# Patient Record
Sex: FEMALE | Race: AMERICAN INDIAN OR ALASKA NATIVE | Employment: FULL TIME | URBAN - METROPOLITAN AREA
[De-identification: names, ages, dates, MRNs, and addresses within clinical notes are randomized per-mention and may not be internally consistent; named-entity substitution may affect disease eponyms.]

---

## 2017-02-03 ENCOUNTER — OFFICE VISIT (OUTPATIENT)
Dept: OBGYN CLINIC | Age: 47
End: 2017-02-03

## 2017-02-03 VITALS
DIASTOLIC BLOOD PRESSURE: 80 MMHG | SYSTOLIC BLOOD PRESSURE: 126 MMHG | HEIGHT: 66 IN | WEIGHT: 171 LBS | HEART RATE: 69 BPM | BODY MASS INDEX: 27.48 KG/M2

## 2017-02-03 DIAGNOSIS — N93.9 ABNORMAL UTERINE BLEEDING (AUB): Primary | ICD-10-CM

## 2017-02-03 DIAGNOSIS — E66.3 OVERWEIGHT (BMI 25.0-29.9): ICD-10-CM

## 2017-02-03 RX ORDER — LANOLIN ALCOHOL/MO/W.PET/CERES
CREAM (GRAM) TOPICAL 2 TIMES DAILY
COMMUNITY

## 2017-02-03 RX ORDER — ZOLPIDEM TARTRATE 10 MG/1
TABLET ORAL
COMMUNITY

## 2017-02-03 NOTE — PATIENT INSTRUCTIONS
Abnormal Uterine Bleeding: Care Instructions  Your Care Instructions  Abnormal uterine bleeding (AUB) is irregular bleeding from the uterus that is longer or heavier than usual or does not occur at your regular time. Sometimes it is caused by changes in hormone levels. It can also be caused by growths in the uterus, such as fibroids or polyps. Sometimes a cause cannot be found. You may have heavy bleeding when you are not expecting your period. Your doctor may suggest a pregnancy test, if you think you are pregnant. Follow-up care is a key part of your treatment and safety. Be sure to make and go to all appointments, and call your doctor if you are having problems. It's also a good idea to know your test results and keep a list of the medicines you take. How can you care for yourself at home? · Be safe with medicines. Take pain medicines exactly as directed. ¨ If the doctor gave you a prescription medicine for pain, take it as prescribed. ¨ If you are not taking a prescription pain medicine, ask your doctor if you can take an over-the-counter medicine. · You may be low in iron because of blood loss. Eat a balanced diet that is high in iron and vitamin C. Foods rich in iron include red meat, shellfish, eggs, beans, and leafy green vegetables. Talk to your doctor about whether you need to take iron pills or a multivitamin. When should you call for help? Call 911 anytime you think you may need emergency care. For example, call if:  · You passed out (lost consciousness). Call your doctor now or seek immediate medical care if:  · You have sudden, severe pain in your belly or pelvis. · You have severe vaginal bleeding. You are soaking through your usual pads or tampons every hour for 2 or more hours. · You feel dizzy or lightheaded, or you feel like you may faint. Watch closely for changes in your health, and be sure to contact your doctor if:  · You have new belly or pelvic pain.   · You have a fever.  · Your bleeding gets worse or lasts longer than 1 week. · You think you may be pregnant. Where can you learn more? Go to http://capo-evette.info/. Enter U301 in the search box to learn more about \"Abnormal Uterine Bleeding: Care Instructions. \"  Current as of: February 25, 2016  Content Version: 11.1  © 3070-5810 [employer]. Care instructions adapted under license by [employer] (which disclaims liability or warranty for this information). If you have questions about a medical condition or this instruction, always ask your healthcare professional. [place] [address] any warranty or liability for your use of this information.

## 2017-02-14 ENCOUNTER — TELEPHONE (OUTPATIENT)
Dept: OBGYN CLINIC | Age: 47
End: 2017-02-14

## 2017-02-14 DIAGNOSIS — N93.9 ABNORMAL UTERINE BLEEDING (AUB): Primary | ICD-10-CM

## 2017-02-14 NOTE — TELEPHONE ENCOUNTER
Patient called regarding test results she had done on Feb 3.  Please fax results to her pcp at [phone]

## 2017-02-14 NOTE — TELEPHONE ENCOUNTER
Patient notified and voices understandin. 2017 office note was faxed as requested to:   PCP, Vicenta Michel PA-C   [phone], fax [phone]  Patient did not have any test for this visit. 2. Sent msg to physician requesting ultrasound order.

## 2017-02-20 ENCOUNTER — HOSPITAL ENCOUNTER (OUTPATIENT)
Dept: ULTRASOUND IMAGING | Age: 47
Discharge: HOME | End: 2017-02-20
Attending: OBSTETRICS & GYNECOLOGY
Payer: OTHER GOVERNMENT

## 2017-02-20 DIAGNOSIS — N93.9 ABNORMAL UTERINE BLEEDING (AUB): ICD-10-CM

## 2017-02-20 PROCEDURE — 76830 TRANSVAGINAL US NON-OB: CPT

## 2017-02-23 ENCOUNTER — TELEPHONE (OUTPATIENT)
Dept: OBGYN CLINIC | Age: 47
End: 2017-02-23

## 2017-02-24 NOTE — TELEPHONE ENCOUNTER
Please advise, patient was to return in 2 weeks to review results and determine appropriate plan of care.

## 2017-02-27 ENCOUNTER — OFFICE VISIT (OUTPATIENT)
Dept: OBGYN CLINIC | Age: 47
End: 2017-02-27

## 2017-02-27 VITALS
WEIGHT: 168 LBS | DIASTOLIC BLOOD PRESSURE: 78 MMHG | HEART RATE: 75 BPM | SYSTOLIC BLOOD PRESSURE: 135 MMHG | HEIGHT: 66 IN | BODY MASS INDEX: 27 KG/M2

## 2017-02-27 DIAGNOSIS — N93.9 ABNORMAL UTERINE BLEEDING (AUB): Primary | ICD-10-CM

## 2017-02-27 LAB
HCG URINE, QL. (POC): NEGATIVE
VALID INTERNAL CONTROL?: YES

## 2017-02-27 RX ORDER — MEDROXYPROGESTERONE ACETATE 150 MG/ML
150 INJECTION, SUSPENSION INTRAMUSCULAR ONCE
Qty: 1 SYRINGE | Refills: 4 | Status: SHIPPED | OUTPATIENT
Start: 2017-02-27 | End: 2017-02-27

## 2017-02-27 NOTE — PROGRESS NOTES
Patient here to discuss pelvic US result and management of AUB. No complaints today. Past Medical History:   Diagnosis Date    Abnormal Papanicolaou smear of cervix     Anemia      Visit Vitals    /78    Pulse 75    Ht 5' 6\" (1.676 m)    Wt 168 lb (76.2 kg)    LMP 01/23/2017    BMI 27.12 kg/m2     Gen:  NAD  I personally reviewed pelvic US report and images with patient, significant for 11 cm uterus with 5 cm anterior intramural leiomyoma, slightly distorting the uterine cavity. Small follicular ovarian cyst.      A/P:  >50% of 20 minute visit spent counseling on     ICD-10-CM ICD-9-CM    1. Abnormal uterine bleeding (AUB) N93.9 626.9 medroxyPROGESTERone (DEPO-PROVERA) 150 mg/mL syrg      IN MEDROXYPROGESTERONE ACETATE, 1MG      IN THER/PROPH/DIAG INJECTION, SUBCUT/IM     Observation with continued weight loss, hormonal management options, UFE, myomectomy, hysterectomy options discussed. Patient opts for medical management at this time. Proper use, common side effects and risks reviewed. Follow up 3 mos for WWE and DMPA dose. Plan of care discussed. Patient expressed understanding and agreement.

## 2017-02-27 NOTE — PROGRESS NOTES
1. Negative UPT. 2. Medroxyprogesterone (Depo Provera) 150 mg, IM injection, left deltoid. 3.  Pt tolerated injection well & voices no complaints.    4. Next injection due May 15, 2017 - May 29, 2017.  1051 [facility], [address] [facility], [address] [facility], EXP

## 2017-02-27 NOTE — PATIENT INSTRUCTIONS
Learning About Birth Control: The Shot  What is the shot? The shot is used to prevent pregnancy. You get the shot in your upper arm or rear end (buttocks). The shot gives you a dose of the hormone progestin. The shot is often called by its brand name, Depo-Provera. Progestin prevents pregnancy in these ways: It thickens the mucus in the cervix. This makes it hard for sperm to travel into the uterus. It also thins the lining of the uterus, which makes it harder for a fertilized egg to attach to the uterus. Progestin can sometimes stop the ovaries from releasing an egg each month (ovulation). The shot provides birth control for 3 months at a time. You then need another shot. The shot can cause bone loss. Most women can use it safely for up to 2 years and then may choose to switch to another form of birth control. Some women may be able to use the shot for more than 2 years. How well does it work? In the first year of use:  · When the shot is used exactly as directed, fewer than 1 woman out of 100 has an unplanned pregnancy. · When the shot is not used exactly as directed, 6 women out of 100 have an unplanned pregnancy. Be sure to tell your doctor about any health problems you have or medicines you take. He or she can help you choose the birth control method that is right for you. What are the advantages of the shot? · The shot is one of the most effective methods of birth control. · It's convenient. You need to get a shot only once every 3 months to prevent pregnancy. You don't have to interrupt sex to protect against pregnancy. · It prevents pregnancy for 3 months at a time. You don't have to worry about birth control for this time. · It's safe to use while breastfeeding. · The shot may reduce heavy bleeding and cramping. · The shot doesn't contain estrogen. So you can use it if you don't want to take estrogen or can't take estrogen because you have certain health problems or concerns.   What are the disadvantages of the shot? · The shot doesn't protect against sexually transmitted infections (STIs), such as herpes or HIV/AIDS. If you aren't sure if your sex partner might have an STI, use a condom to protect against disease. · The shot may cause bone loss in some women. You shouldn't use this method for more than 2 years without talking to your doctor first about the risks and benefits. · Any side effects may last up to 3 months. ¨ The shot may cause irregular periods, or you may have spotting between periods. You may also stop getting a period. Some women see having no period as an advantage. ¨ It may cause mood changes, less interest in sex, or weight gain. · You must go to the doctor every 3 months to get the shot. · If you want to get pregnant, it may take 9 to 10 months after you stop getting the shot. This is because the hormones the shot provided have to leave your system, and your body has to readjust.  · If you have severe side effects, you have to wait for the hormones to get out of your system. This may take up to 3 months. Where can you learn more? Go to http://capo-evette.info/. Enter D630 in the search box to learn more about \"Learning About Birth Control: The Shot. \"  Current as of: May 30, 2016  Content Version: 11.1  © 3188-2609 [employer]. Care instructions adapted under license by [employer] (which disclaims liability or warranty for this information). If you have questions about a medical condition or this instruction, always ask your healthcare professional. [place] [address] any warranty or liability for your use of this information.

## 2017-02-27 NOTE — MR AVS SNAPSHOT
Visit Information Date & Time Provider Department Dept. Phone Encounter #  
 2017  8:15 AM Stacy Cabrera DO [facility] [phone] [id number] Follow-up Instructions Return in about 3 months (around 2017) for WWE and depo. Your Appointments 2017 11:45 AM  
DEPO with Stacy Cabrera DO  
[facility] (MARCOS SCHEDULING) Appt Note: depo [address] [phone]  
[phone]  
  
   
 [address] [phone] Upcoming Health Maintenance Date Due  
 PAP AKA CERVICAL CYTOLOGY 12/3/1991 INFLUENZA AGE 9 TO ADULT 2016 Allergies as of 2017  Review Complete On: 2017 By: Stacy Cabrera DO No Known Allergies Current Immunizations  Never Reviewed No immunizations on file. Not reviewed this visit You Were Diagnosed With   
  
 Codes Comments Abnormal uterine bleeding (AUB)    -  Primary ICD-10-CM: N93.9 ICD-9-CM: 626.9 Vitals BP  
  
  
  
  
  
 135/78 BMI and BSA Data Body Mass Index Body Surface Area  
 27.12 kg/m 2 1.88 m 2 Preferred Pharmacy Pharmacy Name Phone [facility] [place], [address] [phone] Your Updated Medication List  
  
   
This list is accurate as of: 17  9:24 AM.  Always use your most recent med list.  
  
  
  
  
 AMBIEN 10 mg tablet Generic drug:  zolpidem Take  by mouth nightly as needed for Sleep. Iron 325 mg (65 mg iron) tablet Generic drug:  ferrous sulfate Take  by mouth two (2) times a day. medroxyPROGESTERone 150 mg/mL Syrg Commonly known as:  DEPO-PROVERA  
1 mL by IntraMUSCular route once for 1 dose. Indications: AUB Prescriptions Sent to Pharmacy Refills  
 medroxyPROGESTERone (DEPO-PROVERA) 150 mg/mL syrg 4 Si mL by IntraMUSCular route once for 1 dose. Indications: AUB  Class: Normal  
 Pharmacy: [id number] Code On Network Coding. ScreenTag  #: [phone] Route: IntraMUSCular We Performed the Following NE MEDROXYPROGESTERONE ACETATE, 1MG [ [facility]] NE THER/PROPH/DIAG INJECTION, SUBCUT/IM [id number] CPT(R)] Follow-up Instructions Return in about 3 months (around 5/27/2017) for WWE and depo. Patient Instructions Learning About Birth Control: The Shot What is the shot? The shot is used to prevent pregnancy. You get the shot in your upper arm or rear end (buttocks). The shot gives you a dose of the hormone progestin. The shot is often called by its brand name, Depo-Provera. Progestin prevents pregnancy in these ways: It thickens the mucus in the cervix. This makes it hard for sperm to travel into the uterus. It also thins the lining of the uterus, which makes it harder for a fertilized egg to attach to the uterus. Progestin can sometimes stop the ovaries from releasing an egg each month (ovulation). The shot provides birth control for 3 months at a time. You then need another shot. The shot can cause bone loss. Most women can use it safely for up to 2 years and then may choose to switch to another form of birth control. Some women may be able to use the shot for more than 2 years. How well does it work? In the first year of use: · When the shot is used exactly as directed, fewer than 1 woman out of 100 has an unplanned pregnancy. · When the shot is not used exactly as directed, 6 women out of 100 have an unplanned pregnancy. Be sure to tell your doctor about any health problems you have or medicines you take. He or she can help you choose the birth control method that is right for you. What are the advantages of the shot? · The shot is one of the most effective methods of birth control. · It's convenient. You need to get a shot only once every 3 months to prevent pregnancy. You don't have to interrupt sex to protect against pregnancy. · It prevents pregnancy for 3 months at a time. You don't have to worry about birth control for this time. · It's safe to use while breastfeeding. · The shot may reduce heavy bleeding and cramping. · The shot doesn't contain estrogen. So you can use it if you don't want to take estrogen or can't take estrogen because you have certain health problems or concerns. What are the disadvantages of the shot? · The shot doesn't protect against sexually transmitted infections (STIs), such as herpes or HIV/AIDS. If you aren't sure if your sex partner might have an STI, use a condom to protect against disease. · The shot may cause bone loss in some women. You shouldn't use this method for more than 2 years without talking to your doctor first about the risks and benefits. · Any side effects may last up to 3 months. ¨ The shot may cause irregular periods, or you may have spotting between periods. You may also stop getting a period. Some women see having no period as an advantage. ¨ It may cause mood changes, less interest in sex, or weight gain. · You must go to the doctor every 3 months to get the shot. · If you want to get pregnant, it may take 9 to 10 months after you stop getting the shot. This is because the hormones the shot provided have to leave your system, and your body has to readjust. 
· If you have severe side effects, you have to wait for the hormones to get out of your system. This may take up to 3 months. Where can you learn more? Go to http://capo-evette.info/. Enter P379 in the search box to learn more about \"Learning About Birth Control: The Shot. \" Current as of: May 30, 2016 Content Version: 11.1 © 7841-9439 [employer]. Care instructions adapted under license by [employer]gt.[employer] (which disclaims liability or warranty for this information).  If you have questions about a medical condition or this instruction, always ask your healthcare professional. [place] [address] any warranty or liability for your use of this information. Introducing [facility]! Mishel Asif introduces [employer] patient portal. Now you can access parts of your medical record, email your doctor's office, and request medication refills online. 1. In your internet browser, go to [email or web address] 2. Click on the First Time User? Click Here link in the Sign In box. You will see the New Member Sign Up page. 3. Enter your [employer] Access Code exactly as it appears below. You will not need to use this code after youve completed the sign-up process. If you do not sign up before the expiration date, you must request a new code. · [employer] Access Code: [email or web address] Expires: 5/16/2017 11:09 AM 
 
4. Enter the last four digits of your Social Security Number (xxxx) and Date of Birth (mm/dd/yyyy) as indicated and click Submit. You will be taken to the next sign-up page. 5. Create a [employer] ID. This will be your [employer] login ID and cannot be changed, so think of one that is secure and easy to remember. 6. Create a [employer] password. You can change your password at any time. 7. Enter your Password Reset Question and Answer. This can be used at a later time if you forget your password. 8. Enter your e-mail address. You will receive e-mail notification when new information is available in [address] 9. Click Sign Up. You can now view and download portions of your medical record. 10. Click the Download Summary menu link to download a portable copy of your medical information. If you have questions, please visit the Frequently Asked Questions section of the [employer] website. Remember, [employer] is NOT to be used for urgent needs. For medical emergencies, dial 911. Now available from your iPhone and Android! Please provide this summary of care documentation to your next provider. Your primary care clinician is listed as Keshawn Lechuga. If you have any questions after today's visit, please call [phone].

## 2017-05-16 ENCOUNTER — OFFICE VISIT (OUTPATIENT)
Dept: OBGYN CLINIC | Age: 47
End: 2017-05-16

## 2017-05-16 ENCOUNTER — HOSPITAL ENCOUNTER (OUTPATIENT)
Dept: LAB | Age: 47
Discharge: HOME | End: 2017-05-16
Payer: OTHER GOVERNMENT

## 2017-05-16 DIAGNOSIS — N92.1 BREAKTHROUGH BLEEDING ON DEPO PROVERA: ICD-10-CM

## 2017-05-16 DIAGNOSIS — Z01.419 WELL FEMALE EXAM WITH ROUTINE GYNECOLOGICAL EXAM: Primary | ICD-10-CM

## 2017-05-16 DIAGNOSIS — Z30.42 FAMILY PLANNING, DEPO-PROVERA CONTRACEPTION MONITORING/ADMINISTRATION: ICD-10-CM

## 2017-05-16 DIAGNOSIS — N93.9 ABNORMAL UTERINE BLEEDING (AUB): ICD-10-CM

## 2017-05-16 PROBLEM — D25.1 INTRAMURAL LEIOMYOMA OF UTERUS: Status: ACTIVE | Noted: 2017-05-16

## 2017-05-16 LAB
HCG URINE, QL. (POC): NEGATIVE
VALID INTERNAL CONTROL?: YES

## 2017-05-16 PROCEDURE — 88175 CYTOPATH C/V AUTO FLUID REDO: CPT | Performed by: OBSTETRICS & GYNECOLOGY

## 2017-05-16 PROCEDURE — 87624 HPV HI-RISK TYP POOLED RSLT: CPT | Performed by: OBSTETRICS & GYNECOLOGY

## 2017-05-16 RX ORDER — MEDROXYPROGESTERONE ACETATE 150 MG/ML
INJECTION, SUSPENSION INTRAMUSCULAR
COMMUNITY
Start: 2017-02-27 | End: 2018-05-23 | Stop reason: SDUPTHER

## 2017-05-16 NOTE — PROGRESS NOTES
[facility]  [address] [facility], [address] [facility] EXAM    Name: Bee Multani MRN: [id number] SSN: [id number]    YOB: 1970  Age: 55 y.o. Sex: female       Subjective:      Chief complaint:    Chief Complaint   Patient presents with    Well Woman    Family Planning     depo provera injection        Opal Nguyen is a 55 y.o. female presents today for well visit. No complaints. Here for DMPA dose. Reports breakthrough bleeding after first dose. Review of Systems:   General ROS: negative for - fever, chills, malaise  Endocrine ROS: negative for -  breast tenderness/mass/nipple discharge/galactorrhea, hair pattern changes, hot flashes, skin changes or temperature intolerance. Respiratory ROS: no cough, shortness of breath, or wheezing  Cardiovascular ROS: no chest pain or dyspnea on exertion  Gastrointestinal ROS: no abdominal pain, change in bowel habits, or black or bloody stools, nausea, vomiting  Genito-Urinary ROS: no dysuria, trouble voiding, incontinence or hematuria. No pelvic pain, unusual discharge  Musculoskeletal ROS: negative  Neurological ROS: no TIA or stroke symptoms  Dermatological ROS: negative  Denies personal or FH of fractures. OB History      Para Term  AB TAB SAB Ectopic Multiple Living    2 2 2       2        Gynecology:  Hx AUB and uterine fibroid. Health maintenance:  Mammogram up to date: benign. no hx of abnormal pap smears. Last pap:  . Past Medical History:   Diagnosis Date    Abnormal Papanicolaou smear of cervix     Anemia      Past Surgical History:   Procedure Laterality Date    HX COLPOSCOPY      HX ORTHOPAEDIC      LAP,TUBAL CAUTERY       Family History   Problem Relation Age of Onset   [address] [facility] MS Mother     Diabetes Mother     Diabetes Father     Ovarian Cancer Other 39     maternal aunt, early stage     Social History     Occupational History    Not on file.      Social History Main Topics    Smoking status: Former Smoker    Smokeless tobacco: Never Used    Alcohol use No    Drug use: No    Sexual activity: Not Currently     Family History   Problem Relation Age of Onset   [place] MS Mother     Diabetes Mother     Diabetes Father     Ovarian Cancer Other 39     maternal aunt, early stage     No Known Allergies  Prior to Admission medications    Medication Sig Start Date End Date Taking? Authorizing Provider   medroxyPROGESTERone (DEPO-PROVERA) 150 mg/mL injection  2/27/17  Yes Historical Provider   ferrous sulfate (IRON) 325 mg (65 mg iron) tablet Take  by mouth two (2) times a day. Yes Historical Provider   zolpidem (AMBIEN) 10 mg tablet Take  by mouth nightly as needed for Sleep. Yes Historical Provider          Objective: There were no vitals filed for this visit. There is no height or weight on file to calculate BMI. Physical Exam:  General appearance - well appearing, and in no distress and well hydrated  Mental status - alert, oriented to person, place, and time, normal mood, behavior, speech, dress, motor activity, and thought processes  Neck:  No lymphadenopathy, no thyroid enlargement/tenderness  Respiratory:  Normal respiratory effort, no intercostal retractions or use of accessory muscles. Breasts - symmetric fibrocystic tissue upper outer quadrant bilaterally, otherwise, breasts appear normal, no suspicious masses, no skin or nipple changes or axillary nodes  Abdomen - soft, nontender, nondistended, no masses or organomegaly  Pelvic - No inguinal lymphadenopathy, normal urethral meatus and no bladder tenderness.  VULVA: normal appearing vulva with no masses, tenderness or lesions, VAGINA: normal appearing vagina with normal color and discharge, no lesions, PELVIC FLOOR EXAM: no cystocele, rectocele or prolapse noted, CERVIX: normal appearing cervix without discharge or lesions, UTERUS: uterus is anteverted, irregular shape, nontender, mobile, ADNEXA: normal adnexa in size, nontender and no masses  Extremities - No edema. Skin - normal coloration and turgor, no rashes, no suspicious skin lesions noted    Assessment/Plan:       ICD-10-CM ICD-9-CM    1. Well female exam with routine gynecological exam Z01.419 V72.31 PAP IG, APTIMA HPV AND RFX 16/18,45 ([id number])      AMB POC URINE PREGNANCY TEST, VISUAL COLOR COMPARISON   2. Abnormal uterine bleeding (AUB) N93.9 626.9 PA MEDROXYPROGESTERONE ACETATE, 1MG      PA THER/PROPH/DIAG INJECTION, SUBCUT/IM      AMB POC URINE PREGNANCY TEST, VISUAL COLOR COMPARISON   3. Breakthrough bleeding on depo provera N92.1 626.6 AMB POC URINE PREGNANCY TEST, VISUAL COLOR COMPARISON   4. Family planning, Depo-Provera contraception monitoring/administration Z30.42 V25.49 AMB POC URINE PREGNANCY TEST, VISUAL COLOR COMPARISON       Encouraged breast self-awareness. Supplement OTC vitamins or diet rich in vit D and Calcium. Current pap smear screening guidelines reviewed. Provided guidance regarding healthy lifestyle with staying nicotine-free, caloric restrictions and regular exercise. No contraindication to continue DMPA for menstrual regulation. Proper use and common side effects/risks reviewed. Follow up annually/prn. Plan of care discussed. Patient expressed understanding.       Signed By:  Julito Bertrand DO     May 16, 2017

## 2017-05-16 NOTE — PROGRESS NOTES
1. Negative UPT. 2. Medroxyprogesterone (Depo Provera) 150 mg, IM injection, left deltoid. 3.  Pt tolerated injection well & voices no complaints.    4. Next injection due Aug 1, 2017 - Aug 15, 2017.  [address] [facility], [address], [facility], EXP

## 2017-05-16 NOTE — PATIENT INSTRUCTIONS

## 2017-08-01 ENCOUNTER — CLINICAL SUPPORT (OUTPATIENT)
Dept: OBGYN CLINIC | Age: 47
End: 2017-08-01

## 2017-08-01 DIAGNOSIS — N93.9 ABNORMAL UTERINE BLEEDING (AUB): Primary | ICD-10-CM

## 2017-08-01 LAB
HCG URINE, QL. (POC): NEGATIVE
VALID INTERNAL CONTROL?: YES

## 2017-08-01 NOTE — PROGRESS NOTES
1. Negative UPT. 2. Medroxyprogesterone (Depo Provera) 150 mg, IM injection, left deltoid. 3.  Pt tolerated injection well & voices no complaints.    4. Next injection due Oct. 17, 2017 - Oct. 31, 2017.  [facility], [address] [facility], [facility], EXP 01-

## 2017-10-17 ENCOUNTER — CLINICAL SUPPORT (OUTPATIENT)
Dept: OBGYN CLINIC | Age: 47
End: 2017-10-17

## 2017-10-17 DIAGNOSIS — N92.1 BREAKTHROUGH BLEEDING ON DEPO PROVERA: ICD-10-CM

## 2017-10-17 DIAGNOSIS — D25.1 INTRAMURAL LEIOMYOMA OF UTERUS: Primary | ICD-10-CM

## 2017-10-17 DIAGNOSIS — Z30.42 FAMILY PLANNING, DEPO-PROVERA CONTRACEPTION MONITORING/ADMINISTRATION: ICD-10-CM

## 2017-10-17 LAB
HCG URINE, QL. (POC): NEGATIVE
VALID INTERNAL CONTROL?: YES

## 2017-10-17 NOTE — PROGRESS NOTES
1. Negative UPT. 2. Medroxyprogesterone (Depo Provera) 150 mg, IM injection, right deltoid. 3.  Pt tolerated injection well & voices no complaints. 4. Next injection due Jan 2, 2018 - Jan 16, 2018.   1051 [facility], [address], [place] [address], EXP 02-, Artem Gastelum LPN

## 2018-01-02 ENCOUNTER — OFFICE VISIT (OUTPATIENT)
Dept: OBGYN CLINIC | Age: 48
End: 2018-01-02

## 2018-01-02 DIAGNOSIS — N93.9 ABNORMAL UTERINE BLEEDING (AUB): Primary | ICD-10-CM

## 2018-01-02 LAB
HCG URINE, QL. (POC): NEGATIVE
VALID INTERNAL CONTROL?: YES

## 2018-01-02 NOTE — PROGRESS NOTES
Negative UPT. Medroxyprogesterone (Depo Provera) 150 mg, IM injection, left deltoid. Pt tolerated injection well & voices no complaints. Next injection due Mar.  20, 2018 - Apr. 3, 2018.   [place], [address], [address] [place] [address], EXP 03-, Andressa Schmitz LPN

## 2018-03-21 ENCOUNTER — TELEPHONE (OUTPATIENT)
Dept: OBGYN CLINIC | Age: 48
End: 2018-03-21

## 2018-03-22 ENCOUNTER — CLINICAL SUPPORT (OUTPATIENT)
Dept: OBGYN CLINIC | Age: 48
End: 2018-03-22

## 2018-03-22 DIAGNOSIS — Z30.42 FAMILY PLANNING, DEPO-PROVERA CONTRACEPTION MONITORING/ADMINISTRATION: Primary | ICD-10-CM

## 2018-03-22 LAB
HCG URINE, QL. (POC): NEGATIVE
VALID INTERNAL CONTROL?: YES

## 2018-03-22 NOTE — MR AVS SNAPSHOT
Ivet Ayala 
 
 
 [facility] [phone] 
[phone] Patient: Rachel Grey MRN: [id number] Visit Information Date & Time Provider Department Dept. Phone Encounter #  
 3/22/2018  3:30 PM Cheri Piedra, Iraida Sewell [facility] [phone] [id number] Your Appointments 6/7/2018  9:00 AM  
ANNUAL with Ezra Arango MD  
[address] [place] [address] ([address] [place] [address]) Appt Note: Yahaira Morris 39 AND DEPO  
 [facility] [id number]  
[phone]  
  
   
 [facility] [phone] Upcoming Health Maintenance Date Due  
 PAP AKA CERVICAL CYTOLOGY 5/16/2020 Allergies as of 3/22/2018  Review Complete On: 3/22/2018 By: Arlen Elliott LPN No Known Allergies Current Immunizations  Never Reviewed No immunizations on file. Not reviewed this visit You Were Diagnosed With   
  
 Codes Comments Family planning, Depo-Provera contraception monitoring/administration    -  Primary ICD-10-CM: Z30.42 
ICD-9-CM: V25.49 Vitals OB Status Smoking Status Having regular periods Former Smoker Preferred Pharmacy Pharmacy Name Phone [facility] - [address], [address] [phone] Your Updated Medication List  
  
   
This list is accurate as of 3/22/18  4:07 PM.  Always use your most recent med list.  
  
  
  
  
 AMBIEN 10 mg tablet Generic drug:  zolpidem Take  by mouth nightly as needed for Sleep. Iron 325 mg (65 mg iron) tablet Generic drug:  ferrous sulfate Take  by mouth two (2) times a day. medroxyPROGESTERone 150 mg/mL injection Commonly known as:  DEPO-PROVERA We Performed the Following AMB POC URINE PREGNANCY TEST, VISUAL COLOR COMPARISON [18126 CPT(R)] [place] MEDROXYPROGESTERONE ACETATE, 1MG [ [facility]] [place] THER/PROPH/DIAG INJECTION, SUBCUT/IM [id number] CPT(R)] Introducing [facility]! Dear Valerie Kurtz: 
Thank you for requesting a [employer] account. Our records indicate that you already have an active [employer] account. You can access your account anytime at [email or web address] Did you know that you can access your hospital and ER discharge instructions at any time in [employer]? You can also review all of your test results from your hospital stay or ER visit. Additional Information If you have questions, please visit the Frequently Asked Questions section of the [employer]ell website at [email or web address] Remember, [employer] is NOT to be used for urgent needs. For medical emergencies, dial 911. Now available from your iPhone and Android! Please provide this summary of care documentation to your next provider. Your primary care clinician is listed as Cam Hess. If you have any questions after today's visit, please call [phone].

## 2018-03-22 NOTE — PROGRESS NOTES
Negative UPT. Medroxyprogesterone (Depo Provera) 150 mg, IM injection, left deltoid. Pt tolerated injection well & voices no complaints. Next injection due Jun 7-21, 2018, at [facility].    Alena, [address] [id number] EXP 03-, Jessica Ag LPN

## 2018-05-24 RX ORDER — MEDROXYPROGESTERONE ACETATE 150 MG/ML
150 INJECTION, SUSPENSION INTRAMUSCULAR ONCE
Qty: 1 ML | Refills: 0 | Status: SHIPPED | OUTPATIENT
Start: 2018-05-24 | End: 2018-05-24

## 2018-06-07 ENCOUNTER — OFFICE VISIT (OUTPATIENT)
Dept: OBGYN CLINIC | Age: 48
End: 2018-06-07

## 2018-06-07 DIAGNOSIS — N93.9 ABNORMAL UTERINE BLEEDING (AUB): Primary | ICD-10-CM

## 2018-06-07 RX ORDER — MEDROXYPROGESTERONE ACETATE 150 MG/ML
150 INJECTION, SUSPENSION INTRAMUSCULAR ONCE
Qty: 1 SYRINGE | Refills: 0
Start: 2018-06-07 | End: 2018-06-07

## 2018-08-15 ENCOUNTER — OFFICE VISIT (OUTPATIENT)
Dept: OBGYN CLINIC | Age: 48
End: 2018-08-15

## 2018-08-15 VITALS
HEART RATE: 92 BPM | DIASTOLIC BLOOD PRESSURE: 71 MMHG | WEIGHT: 180 LBS | BODY MASS INDEX: 28.93 KG/M2 | HEIGHT: 66 IN | SYSTOLIC BLOOD PRESSURE: 121 MMHG

## 2018-08-15 DIAGNOSIS — N93.9 ABNORMAL UTERINE BLEEDING (AUB): ICD-10-CM

## 2018-08-15 DIAGNOSIS — Z01.419 WELL WOMAN EXAM WITH ROUTINE GYNECOLOGICAL EXAM: Primary | ICD-10-CM

## 2018-08-15 RX ORDER — MEDROXYPROGESTERONE ACETATE 150 MG/ML
150 INJECTION, SUSPENSION INTRAMUSCULAR ONCE
Qty: 1 ML | Refills: 3 | Status: SHIPPED | OUTPATIENT
Start: 2018-08-15 | End: 2018-08-15

## 2018-08-15 RX ORDER — MEDROXYPROGESTERONE ACETATE 150 MG/ML
150 INJECTION, SUSPENSION INTRAMUSCULAR ONCE
COMMUNITY
End: 2018-08-15 | Stop reason: SDUPTHER

## 2018-08-15 NOTE — PROGRESS NOTES
Subjective:   52 y.o. female for Well Woman Check. She has no concerns. Uses Depo for cycle control and only notes bleeding the last several weeks prior to the injection. No LMP recorded. Patient has had an injection. Social History: not sexually active. Pertinent past medical hstory: no history of HTN, DVT, CAD, DM, liver disease, migraines or smoking. Patient Active Problem List   Diagnosis Code    Overweight (BMI 25.0-29. 9) E66.3    Abnormal uterine bleeding (AUB) N93.9    Breakthrough bleeding on depo provera N92.1    Intramural leiomyoma of uterus D25.1     Current Outpatient Prescriptions   Medication Sig Dispense Refill    medroxyPROGESTERone (DEPO-PROVERA) 150 mg/mL injection 150 mg by IntraMUSCular route once. Indications: Last dose 6/7      zolpidem (AMBIEN) 10 mg tablet Take  by mouth nightly as needed for Sleep.  ferrous sulfate (IRON) 325 mg (65 mg iron) tablet Take  by mouth two (2) times a day. No Known Allergies  Past Medical History:   Diagnosis Date    Abnormal Papanicolaou smear of cervix     Anemia      Past Surgical History:   Procedure Laterality Date    HX COLPOSCOPY      HX ORTHOPAEDIC      LAP,TUBAL CAUTERY       Family History   Problem Relation Age of Onset   24 [facility] MS Mother     Diabetes Mother     Hypertension Mother     Crohn's Disease Mother     Diabetes Father     Hypertension Father     Ovarian Cancer Other 39     maternal aunt, early stage     Social History   Substance Use Topics    Smoking status: Former Smoker    Smokeless tobacco: Never Used    Alcohol use No        ROS:  Feeling well. No dyspnea or chest pain on exertion. No abdominal pain, change in bowel habits, black or bloody stools. No urinary tract symptoms. GYN ROS: no breast pain or new or enlarging lumps on self exam. No neurological complaints.     Objective:     Visit Vitals    /71    Pulse 92    Ht 5' 6\" (1.676 m)    Wt 180 lb (81.6 kg)    BMI 29.05 kg/m2     The patient appears well, alert, oriented x 3, in no distress. ENT normal.  Neck supple. No adenopathy or thyromegaly. TASHA. Lungs are clear, good air entry, no wheezes, rhonchi or rales. S1 and S2 normal, no murmurs, regular rate and rhythm. Abdomen soft without tenderness, guarding, mass or organomegaly. Extremities show no edema, normal peripheral pulses. Neurological is normal, no focal findings. BREAST EXAM: breasts appear normal, no suspicious masses, no skin or nipple changes or axillary nodes    PELVIC EXAM: normal external genitalia, vulva, vagina, cervix, uterus and adnexa    Assessment/Plan:   well woman  no contraindication to continue hormonal therapy- Depo Provera. mammogram  pap smear normal in 2017  counseled on breast self exam, family planning choices and menopause  return annually or prn    ICD-10-CM ICD-9-CM    1. Well woman exam with routine gynecological exam Z01.419 V72.31 CANCELED: PAP IG, APTIMA HPV AND RFX 16/18,45 ([id number])   .

## 2018-08-16 ENCOUNTER — DOCUMENTATION ONLY (OUTPATIENT)
Dept: OBGYN CLINIC | Age: 48
End: 2018-08-16

## 2018-08-23 ENCOUNTER — CLINICAL SUPPORT (OUTPATIENT)
Dept: OBGYN CLINIC | Age: 48
End: 2018-08-23

## 2018-08-23 DIAGNOSIS — Z30.09 FAMILY PLANNING: Primary | ICD-10-CM

## 2018-08-23 NOTE — PROGRESS NOTES
Date last pap: 08/17. Last Depo-Provera: 03/22.   Side Effects if any: none  Serum HCG indicated? negative  Depo-Provera 150 mg IM given by: L arm  Next appointment due 11/08-22/18

## 2019-01-21 ENCOUNTER — OFFICE VISIT (OUTPATIENT)
Dept: OBGYN CLINIC | Age: 49
End: 2019-01-21

## 2019-01-24 ENCOUNTER — OFFICE VISIT (OUTPATIENT)
Dept: OBGYN CLINIC | Age: 49
End: 2019-01-24

## 2019-01-24 VITALS
TEMPERATURE: 98.1 F | BODY MASS INDEX: 30.86 KG/M2 | HEART RATE: 90 BPM | RESPIRATION RATE: 18 BRPM | HEIGHT: 66 IN | WEIGHT: 192 LBS | SYSTOLIC BLOOD PRESSURE: 134 MMHG | DIASTOLIC BLOOD PRESSURE: 87 MMHG

## 2019-01-24 DIAGNOSIS — N92.6 IRREGULAR MENSES: Primary | ICD-10-CM

## 2019-01-24 NOTE — PROGRESS NOTES
51 y/o  presents to the office for a GYN disability claim she would like to file and needs corresponding documentation. She has no concerns today. She has been treated for AUB with Depo Provera by Dr. Mike Loza. She states she no longer uses the Depo. She also has a documented 5 cm fibroid. Her previous claim was for recurrent ovarian cysts and abnormal pap smears. Last pap done 2 years ago normal with negative HPV. Visit Vitals  /87   Pulse 90   Temp 98.1 °F (36.7 °C) (Oral)   Resp 18   Ht 5' 6\" (1.676 m)   Wt 192 lb (87.1 kg)   BMI 30.99 kg/m²     Exam deferred    A/P  No acute or chronic issues requiring treatment at this time  Pt. Discontinued Depo Provera. No complaints at this time. Disability paperwork and records left for review and completion.